# Patient Record
Sex: MALE | Race: WHITE | NOT HISPANIC OR LATINO | URBAN - METROPOLITAN AREA
[De-identification: names, ages, dates, MRNs, and addresses within clinical notes are randomized per-mention and may not be internally consistent; named-entity substitution may affect disease eponyms.]

---

## 2021-05-14 ENCOUNTER — OFFICE (OUTPATIENT)
Dept: URBAN - METROPOLITAN AREA CLINIC 79 | Facility: CLINIC | Age: 81
End: 2021-05-14
Payer: MEDICARE

## 2021-05-14 VITALS
HEIGHT: 65 IN | HEART RATE: 61 BPM | SYSTOLIC BLOOD PRESSURE: 129 MMHG | DIASTOLIC BLOOD PRESSURE: 70 MMHG | WEIGHT: 159.4 LBS

## 2021-05-14 DIAGNOSIS — R10.33 PERIUMBILICAL PAIN: ICD-10-CM

## 2021-05-14 DIAGNOSIS — R68.81 EARLY SATIETY: ICD-10-CM

## 2021-05-14 DIAGNOSIS — R63.4 ABNORMAL WEIGHT LOSS: ICD-10-CM

## 2021-05-14 DIAGNOSIS — R93.89 ABNORMAL FINDINGS ON DIAGNOSTIC IMAGING OF OTHER SPECIFIED B: ICD-10-CM

## 2021-05-14 DIAGNOSIS — R19.4 CHANGE IN BOWEL HABIT: ICD-10-CM

## 2021-05-14 PROCEDURE — 99204 OFFICE O/P NEW MOD 45 MIN: CPT | Performed by: PHYSICIAN ASSISTANT

## 2021-05-14 NOTE — SERVICEHPINOTES
He has pain that began under his umbilicus which has been there for a few yrs. He has been to the ER more than once. Pain is there constantly but much worse with PO intake. He has multiple BMs per day, rare nocturnal BMs, max 9 BM per day. Stools are a type 6 or 1 on the BSS. His stools look "slimy". The BMs are thinner than a pencil--has been this way since a partial colectomy. He worries about a blockage. No blood in the stool.   He had nausea/vomiting. He was given pain medications and antiemetics by the ER. Since taking this, no longer vomiting and no longer nauseous. + early satiety and weight loss.  BRCT scan w/o contrast shows mild concentric wall thickening of the mid transverse colon suspicious for potential malignancy. Patient had a CT angiogram of the ab and pelvis on 5/11/21 which showed slight wall thickening of the gastric antrum. It also showed a decompressed colon limiting evaluation for previously identified short segment colonic wall thickening.He had a partial colectomy a few yrs ago at [facility] [place], [place] for diverticulitis. Unclear if he had a colonoscopy then. He is unsure when his last colonoscopy was performed. He has lost weight. He usually weighs 177 lbs lost this over a few months. No family hx of CRC or polyps. No known heart issues.  BR

## 2021-05-18 ENCOUNTER — ON CAMPUS - OUTPATIENT (OUTPATIENT)
Dept: URBAN - METROPOLITAN AREA HOSPITAL 16 | Facility: HOSPITAL | Age: 81
End: 2021-05-18
Payer: MEDICARE

## 2021-05-18 DIAGNOSIS — K29.60 OTHER GASTRITIS WITHOUT BLEEDING: ICD-10-CM

## 2021-05-18 DIAGNOSIS — R68.81 EARLY SATIETY: ICD-10-CM

## 2021-05-18 DIAGNOSIS — R19.4 CHANGE IN BOWEL HABIT: ICD-10-CM

## 2021-05-18 DIAGNOSIS — D64.9 ANEMIA, UNSPECIFIED: ICD-10-CM

## 2021-05-18 PROCEDURE — 45378 DIAGNOSTIC COLONOSCOPY: CPT | Performed by: INTERNAL MEDICINE

## 2021-05-18 PROCEDURE — 43239 EGD BIOPSY SINGLE/MULTIPLE: CPT | Performed by: INTERNAL MEDICINE

## 2021-09-17 ENCOUNTER — OFFICE (OUTPATIENT)
Dept: URBAN - METROPOLITAN AREA CLINIC 34 | Facility: CLINIC | Age: 81
End: 2021-09-17
Payer: MEDICARE

## 2021-09-17 VITALS
WEIGHT: 161 LBS | HEART RATE: 58 BPM | DIASTOLIC BLOOD PRESSURE: 75 MMHG | TEMPERATURE: 97 F | HEIGHT: 65 IN | SYSTOLIC BLOOD PRESSURE: 153 MMHG

## 2021-09-17 DIAGNOSIS — K90.9 INTESTINAL MALABSORPTION, UNSPECIFIED: ICD-10-CM

## 2021-09-17 DIAGNOSIS — R10.30 LOWER ABDOMINAL PAIN, UNSPECIFIED: ICD-10-CM

## 2021-09-17 DIAGNOSIS — R19.7 DIARRHEA, UNSPECIFIED: ICD-10-CM

## 2021-09-17 PROCEDURE — 99214 OFFICE O/P EST MOD 30 MIN: CPT | Performed by: PHYSICIAN ASSISTANT

## 2021-09-17 NOTE — SERVICEHPINOTES
Mr. Garcia is here for f/u. He had a colonoscopy and EGD for weight loss and change in bowel habits. He was having pencil like thin stools. Since the colonoscopy, all of his stools have been loose and float. He notes dysuria just hard to start a urine stream but no burning. He also has problems with controlling his urine stream--will see his PCP about this. No decrease in urine output. He has lower pain in his gut with eating. He was given Dicylcomine 10 mg to take by his PCP which helped a little bit--but now is out of it. PO intake will trigger a BM will sometimes have to go back 3-4 times to have a BM. Has 5-7 BM per day. No nocturnal stools. He has severe cramping w/the BM. + fecal urgency. No blood in the stool. Colonoscopy unremarkable except for hemorrhoids. No fever, chills, or weight loss. No nocturnal stool. + stools float. He smokes marijuana but yrs ago, drank too much ETOH. No recent abx use, travel outside of the country, illicit food triggers, + well water. His wife had diarrhea and passed suddenly this week. No regular NSAID use.

## 2021-09-20 LAB
C DIFFICILE TOXIN GENE NAA: NEGATIVE
GI PROFILE, STOOL, PCR: ADENOVIRUS F 40/41: NOT DETECTED
GI PROFILE, STOOL, PCR: ASTROVIRUS: NOT DETECTED
GI PROFILE, STOOL, PCR: C DIFFICILE TOXIN A/B: NOT DETECTED
GI PROFILE, STOOL, PCR: CAMPYLOBACTER: NOT DETECTED
GI PROFILE, STOOL, PCR: CRYPTOSPORIDIUM: NOT DETECTED
GI PROFILE, STOOL, PCR: CYCLOSPORA CAYETANENSIS: NOT DETECTED
GI PROFILE, STOOL, PCR: E COLI O157: NOT DETECTED
GI PROFILE, STOOL, PCR: ENTAMOEBA HISTOLYTICA: NOT DETECTED
GI PROFILE, STOOL, PCR: ENTEROAGGREGATIVE E COLI: NOT DETECTED
GI PROFILE, STOOL, PCR: ENTEROPATHOGENIC E COLI: DETECTED
GI PROFILE, STOOL, PCR: ENTEROTOXIGENIC E COLI: NOT DETECTED
GI PROFILE, STOOL, PCR: GIARDIA LAMBLIA: NOT DETECTED
GI PROFILE, STOOL, PCR: NOROVIRUS GI/GII: NOT DETECTED
GI PROFILE, STOOL, PCR: PLESIOMONAS SHIGELLOIDES: NOT DETECTED
GI PROFILE, STOOL, PCR: ROTAVIRUS A: NOT DETECTED
GI PROFILE, STOOL, PCR: SALMONELLA: NOT DETECTED
GI PROFILE, STOOL, PCR: SAPOVIRUS: NOT DETECTED
GI PROFILE, STOOL, PCR: SHIGA-TOXIN-PRODUCING E COLI: NOT DETECTED
GI PROFILE, STOOL, PCR: SHIGELLA/ENTEROINVASIVE E COLI: NOT DETECTED
GI PROFILE, STOOL, PCR: VIBRIO CHOLERAE: NOT DETECTED
GI PROFILE, STOOL, PCR: VIBRIO: NOT DETECTED
GI PROFILE, STOOL, PCR: YERSINIA ENTEROCOLITICA: NOT DETECTED
PANCREATIC ELASTASE, FECAL: 397 UG ELAST./G (ref 200–?)